# Patient Record
Sex: FEMALE | Race: WHITE | NOT HISPANIC OR LATINO
[De-identification: names, ages, dates, MRNs, and addresses within clinical notes are randomized per-mention and may not be internally consistent; named-entity substitution may affect disease eponyms.]

---

## 2019-06-21 PROBLEM — Z00.00 ENCOUNTER FOR PREVENTIVE HEALTH EXAMINATION: Status: ACTIVE | Noted: 2019-06-21

## 2019-06-22 ENCOUNTER — RECORD ABSTRACTING (OUTPATIENT)
Age: 54
End: 2019-06-22

## 2019-06-22 ENCOUNTER — OTHER (OUTPATIENT)
Age: 54
End: 2019-06-22

## 2019-06-22 DIAGNOSIS — Z87.39 PERSONAL HISTORY OF OTHER DISEASES OF THE MUSCULOSKELETAL SYSTEM AND CONNECTIVE TISSUE: ICD-10-CM

## 2019-06-22 DIAGNOSIS — Z86.39 PERSONAL HISTORY OF OTHER ENDOCRINE, NUTRITIONAL AND METABOLIC DISEASE: ICD-10-CM

## 2019-06-22 DIAGNOSIS — F17.200 NICOTINE DEPENDENCE, UNSPECIFIED, UNCOMPLICATED: ICD-10-CM

## 2019-06-22 DIAGNOSIS — Z86.59 PERSONAL HISTORY OF OTHER MENTAL AND BEHAVIORAL DISORDERS: ICD-10-CM

## 2019-06-22 DIAGNOSIS — Z87.19 PERSONAL HISTORY OF OTHER DISEASES OF THE DIGESTIVE SYSTEM: ICD-10-CM

## 2019-06-22 RX ORDER — FOLIC ACID 1 MG/1
1 TABLET ORAL
Refills: 0 | Status: ACTIVE | COMMUNITY

## 2019-06-22 RX ORDER — METHOTREXATE 2.5 MG/1
2.5 TABLET ORAL
Refills: 0 | Status: DISCONTINUED | COMMUNITY
End: 2019-03-25

## 2019-06-23 ENCOUNTER — RECORD ABSTRACTING (OUTPATIENT)
Age: 54
End: 2019-06-23

## 2019-06-23 RX ORDER — LEVOTHYROXINE SODIUM 0.05 MG/1
50 TABLET ORAL
Refills: 0 | Status: DISCONTINUED | COMMUNITY
End: 2019-06-23

## 2019-06-23 RX ORDER — LITHIUM CARBONATE 300 MG/1
300 CAPSULE ORAL
Refills: 0 | Status: DISCONTINUED | COMMUNITY
End: 2019-06-23

## 2019-06-23 RX ORDER — LEVOTHYROXINE SODIUM 0.07 MG/1
75 TABLET ORAL
Refills: 0 | Status: ACTIVE | COMMUNITY

## 2019-06-23 RX ORDER — DULOXETINE HYDROCHLORIDE 30 MG/1
CAPSULE, DELAYED RELEASE ORAL
Refills: 0 | Status: DISCONTINUED | COMMUNITY
End: 2019-06-23

## 2019-06-24 ENCOUNTER — OTHER (OUTPATIENT)
Age: 54
End: 2019-06-24

## 2019-06-25 ENCOUNTER — APPOINTMENT (OUTPATIENT)
Dept: SURGERY | Facility: CLINIC | Age: 54
End: 2019-06-25
Payer: COMMERCIAL

## 2019-06-25 VITALS
SYSTOLIC BLOOD PRESSURE: 119 MMHG | BODY MASS INDEX: 18.44 KG/M2 | HEART RATE: 108 BPM | WEIGHT: 108 LBS | DIASTOLIC BLOOD PRESSURE: 78 MMHG | HEIGHT: 64 IN

## 2019-06-25 DIAGNOSIS — K64.8 OTHER HEMORRHOIDS: ICD-10-CM

## 2019-06-25 PROCEDURE — 99203 OFFICE O/P NEW LOW 30 MIN: CPT

## 2019-06-25 NOTE — PLAN
[FreeTextEntry1] : warm soaks, lidcoaine jelly, hydrocortisone cream, recommend a colorectal surgeon for consideration of non operative approach to hemorrhoids. She will call her insurance [employer] to find out who is in plan. If no colorectal surgeon is in plan, I recommended she negotiate with her insurance company to find someone who can see her since these are procedures that i do not do and her colorectal history is complex. .

## 2019-06-25 NOTE — HISTORY OF PRESENT ILLNESS
[de-identified] : Pt has complex colon history of dysmotility and atonic megacolon eventually underwent colon resection, with colostomy and reversal. Has seen Dr Esparza at [facility] for this. she now presents with enlarged prolapsing grade 3 hemorrhoids which she has had for many weeks. She has frequent loose bowel movements daily which irritates the hemorrhoiSs. she sees some blood when she wipes but none on the bowl or streaking the stool. She has been using lidocaine jelly. Dr Esparza does not take her insurance anymore. she was referred back to me by Dr Harris.

## 2019-06-25 NOTE — PHYSICAL EXAM
[Normal Heart Sounds] : normal heart sounds [de-identified] : NAD [de-identified] : grade 3 right posterolateral and anterolateral hemorrhoids, mucoas slightly macerated

## 2019-07-19 ENCOUNTER — OTHER (OUTPATIENT)
Age: 54
End: 2019-07-19

## 2022-06-17 ENCOUNTER — TRANSCRIPTION ENCOUNTER (OUTPATIENT)
Age: 57
End: 2022-06-17

## 2022-11-22 ENCOUNTER — TRANSCRIPTION ENCOUNTER (OUTPATIENT)
Age: 57
End: 2022-11-22

## 2023-12-15 ENCOUNTER — TRANSCRIPTION ENCOUNTER (OUTPATIENT)
Age: 58
End: 2023-12-15

## 2024-02-21 ENCOUNTER — RESULT REVIEW (OUTPATIENT)
Age: 59
End: 2024-02-21

## 2024-02-23 ENCOUNTER — TRANSCRIPTION ENCOUNTER (OUTPATIENT)
Age: 59
End: 2024-02-23

## 2024-03-07 ENCOUNTER — TRANSCRIPTION ENCOUNTER (OUTPATIENT)
Age: 59
End: 2024-03-07

## 2024-03-09 ENCOUNTER — RESULT REVIEW (OUTPATIENT)
Age: 59
End: 2024-03-09

## 2024-03-11 ENCOUNTER — TRANSCRIPTION ENCOUNTER (OUTPATIENT)
Age: 59
End: 2024-03-11

## 2024-03-28 ENCOUNTER — APPOINTMENT (OUTPATIENT)
Dept: INFECTIOUS DISEASE | Facility: CLINIC | Age: 59
End: 2024-03-28
Payer: MEDICARE

## 2024-03-28 VITALS
BODY MASS INDEX: 18.44 KG/M2 | HEART RATE: 77 BPM | OXYGEN SATURATION: 98 % | WEIGHT: 108 LBS | DIASTOLIC BLOOD PRESSURE: 70 MMHG | SYSTOLIC BLOOD PRESSURE: 110 MMHG | HEIGHT: 64 IN | TEMPERATURE: 98.7 F

## 2024-03-28 PROCEDURE — 99213 OFFICE O/P EST LOW 20 MIN: CPT

## 2024-03-28 RX ORDER — VENLAFAXINE 37.5 MG/1
37.5 TABLET ORAL
Refills: 0 | Status: ACTIVE | COMMUNITY

## 2024-03-28 RX ORDER — HYDROXYCHLOROQUINE SULFATE 200 MG/1
200 TABLET, FILM COATED ORAL
Refills: 0 | Status: ACTIVE | COMMUNITY

## 2024-03-28 RX ORDER — BUPROPION HYDROCHLORIDE 300 MG/1
300 TABLET, EXTENDED RELEASE ORAL
Refills: 0 | Status: ACTIVE | COMMUNITY

## 2024-03-28 RX ORDER — NALTREXONE HYDROCHLORIDE 50 MG/1
50 TABLET, FILM COATED ORAL
Refills: 0 | Status: ACTIVE | COMMUNITY

## 2024-03-28 RX ORDER — ATORVASTATIN CALCIUM 80 MG/1
80 TABLET, FILM COATED ORAL
Refills: 0 | Status: ACTIVE | COMMUNITY

## 2024-03-28 RX ORDER — CLONAZEPAM 1 MG/1
1 TABLET ORAL
Refills: 0 | Status: ACTIVE | COMMUNITY

## 2024-03-28 RX ORDER — CLONIDINE HYDROCHLORIDE 0.3 MG/1
TABLET ORAL
Refills: 0 | Status: DISCONTINUED | COMMUNITY
End: 2024-03-28

## 2024-03-28 RX ORDER — HYDROMORPHONE HYDROCHLORIDE 2 MG/1
2 TABLET ORAL
Refills: 0 | Status: ACTIVE | COMMUNITY

## 2024-03-28 RX ORDER — CYCLOBENZAPRINE HYDROCHLORIDE 10 MG/1
10 TABLET, FILM COATED ORAL
Refills: 0 | Status: ACTIVE | COMMUNITY

## 2024-03-28 RX ORDER — GABAPENTIN 800 MG/1
800 TABLET, COATED ORAL
Refills: 0 | Status: ACTIVE | COMMUNITY

## 2024-03-28 RX ORDER — CLOPIDOGREL 75 MG/1
75 TABLET, FILM COATED ORAL
Refills: 0 | Status: ACTIVE | COMMUNITY

## 2024-03-28 RX ORDER — CHLORHEXIDINE GLUCONATE 4 %
325 (65 FE) LIQUID (ML) TOPICAL
Refills: 0 | Status: ACTIVE | COMMUNITY

## 2024-03-28 RX ORDER — DICLOFENAC SODIUM 75 MG/1
75 TABLET, DELAYED RELEASE ORAL
Refills: 0 | Status: ACTIVE | COMMUNITY

## 2024-03-28 RX ORDER — METOPROLOL SUCCINATE 25 MG/1
25 TABLET, EXTENDED RELEASE ORAL
Refills: 0 | Status: ACTIVE | COMMUNITY

## 2024-03-28 RX ORDER — PANTOPRAZOLE 40 MG/1
40 TABLET, DELAYED RELEASE ORAL
Refills: 0 | Status: ACTIVE | COMMUNITY

## 2024-03-28 NOTE — HISTORY OF PRESENT ILLNESS
[FreeTextEntry1] : 58-year-old female with medical history of rheumatoid arthritis, on rituximab which was stopped a year and a half ago, CAD s/p CABG 2020/stents last month, hypertension, dyslipidemia, history of C. difficile, partial colectomy for bowel obstruction presenting with left wrist pain s/p aspiration of left wrist with [id number] WBCs (75% neutrophils)-no crystals identified with polarization microscopy. status post washout of left wrist 3/7/2024 - culture no growth.  Patient completed daptomycin/ceftriaxone through 3/21/2024 and then transition to Augmentin/doxycycline to complete a total of 4 weeks course. Patient saw Dr. Singh last week who said wound is doing fine. she has no complaints today.

## 2024-03-28 NOTE — PHYSICAL EXAM
[General Appearance - Alert] : alert [General Appearance - In No Acute Distress] : in no acute distress [Extraocular Movements] : extraocular movements were intact [] : the neck was supple [Heart Sounds] : normal S1 and S2 [Auscultation Breath Sounds / Voice Sounds] : lungs were clear to auscultation bilaterally [Edema] : there was no peripheral edema [Abdomen Soft] : soft [Abdomen Tenderness] : non-tender [FreeTextEntry1] : wrist joint surgical wound healed [Oriented To Time, Place, And Person] : oriented to person, place, and time [No Focal Deficits] : no focal deficits

## 2024-03-28 NOTE — REVIEW OF SYSTEMS
[Eye Pain] : no eye pain [Chills] : no chills [Fever] : no fever [Earache] : no earache [Chest Pain] : no chest pain [Shortness Of Breath] : no shortness of breath [Vomiting] : no vomiting [Dysuria] : no dysuria [Confused] : no confusion

## 2024-03-28 NOTE — ASSESSMENT
[FreeTextEntry1] : 58-year-old female with medical history of rheumatoid arthritis, on rituximab which was stopped a year and a half ago, CAD s/p CABG 2020/stents last month, hypertension, dyslipidemia, history of C. difficile, partial colectomy for bowel obstruction presenting with left wrist pain s/p aspiration of left wrist with [id number] WBCs (75% neutrophils)-no crystals identified with polarization microscopy. status post washout of left wrist 3/7/2024 - culture no growth.  Patient completed daptomycin/ceftriaxone through 3/21/2024 and then transition to Augmentin/doxycycline to complete a total of 4 weeks course. Saw Dr Singh last week who said wound is healing/healed good. no new complaints.  Microbiology Blood culture from 3/6 no growth to date Aspirate culture from 3/7 no growth to date Surgical culture from 3/7 no growth to date  #Septic left wrist joint s/p washout 3/7/24  Complete course for Augmentin/doxycycline Prescription sent to patient's preferred pharmacy for an additional week to complete total of at least 4 weeks course. Patient tolerating antibiotics without any issues Most recent lab results also reviewed with patient and father. further management and follow with Dr. Byers-Primary doctor discussed with Dr. Byers Plan of care discussed with patient and father and all questions answered. Contact information of clinic provided for questions. Further management as per primary team/surgery  This note was partially created using a voice-recognition program.  Typographical errors may occur that escape my detection. Please contact me or hospital if a word or phrase does not appear to fit into the contextual sense  [Treatment Education] : treatment education [Rx Dose / Side Effects] : Rx dose/side effects [Treatment Adherence] : treatment adherence [Drug Interactions / Side Effects] : drug interactions/side effects

## 2025-04-26 ENCOUNTER — RESULT REVIEW (OUTPATIENT)
Age: 60
End: 2025-04-26

## 2025-04-28 ENCOUNTER — RESULT REVIEW (OUTPATIENT)
Age: 60
End: 2025-04-28

## 2025-04-29 ENCOUNTER — TRANSCRIPTION ENCOUNTER (OUTPATIENT)
Age: 60
End: 2025-04-29

## 2025-07-01 ENCOUNTER — TRANSCRIPTION ENCOUNTER (OUTPATIENT)
Age: 60
End: 2025-07-01